# Patient Record
Sex: FEMALE | Race: WHITE
[De-identification: names, ages, dates, MRNs, and addresses within clinical notes are randomized per-mention and may not be internally consistent; named-entity substitution may affect disease eponyms.]

---

## 2021-12-17 ENCOUNTER — HOSPITAL ENCOUNTER (INPATIENT)
Dept: HOSPITAL 19 - LDRO | Age: 26
LOS: 1 days | Discharge: HOME | End: 2021-12-18
Attending: OBSTETRICS & GYNECOLOGY | Admitting: OBSTETRICS & GYNECOLOGY
Payer: COMMERCIAL

## 2021-12-17 VITALS — TEMPERATURE: 98.4 F | DIASTOLIC BLOOD PRESSURE: 84 MMHG | HEART RATE: 137 BPM | SYSTOLIC BLOOD PRESSURE: 126 MMHG

## 2021-12-17 VITALS — DIASTOLIC BLOOD PRESSURE: 70 MMHG | HEART RATE: 101 BPM | SYSTOLIC BLOOD PRESSURE: 151 MMHG

## 2021-12-17 VITALS — HEART RATE: 80 BPM | SYSTOLIC BLOOD PRESSURE: 137 MMHG | DIASTOLIC BLOOD PRESSURE: 62 MMHG

## 2021-12-17 VITALS — HEART RATE: 118 BPM | DIASTOLIC BLOOD PRESSURE: 85 MMHG | SYSTOLIC BLOOD PRESSURE: 158 MMHG

## 2021-12-17 VITALS — SYSTOLIC BLOOD PRESSURE: 127 MMHG | HEART RATE: 114 BPM | TEMPERATURE: 98.2 F | DIASTOLIC BLOOD PRESSURE: 74 MMHG

## 2021-12-17 VITALS — DIASTOLIC BLOOD PRESSURE: 85 MMHG | TEMPERATURE: 98.2 F | HEART RATE: 98 BPM | SYSTOLIC BLOOD PRESSURE: 137 MMHG

## 2021-12-17 VITALS — SYSTOLIC BLOOD PRESSURE: 145 MMHG | DIASTOLIC BLOOD PRESSURE: 80 MMHG | HEART RATE: 111 BPM

## 2021-12-17 VITALS — HEART RATE: 150 BPM | SYSTOLIC BLOOD PRESSURE: 165 MMHG | DIASTOLIC BLOOD PRESSURE: 74 MMHG

## 2021-12-17 VITALS — TEMPERATURE: 98 F | HEART RATE: 77 BPM | SYSTOLIC BLOOD PRESSURE: 136 MMHG | DIASTOLIC BLOOD PRESSURE: 83 MMHG

## 2021-12-17 VITALS — SYSTOLIC BLOOD PRESSURE: 139 MMHG | DIASTOLIC BLOOD PRESSURE: 76 MMHG | HEART RATE: 127 BPM

## 2021-12-17 VITALS — SYSTOLIC BLOOD PRESSURE: 134 MMHG | HEART RATE: 96 BPM | DIASTOLIC BLOOD PRESSURE: 79 MMHG

## 2021-12-17 VITALS — DIASTOLIC BLOOD PRESSURE: 74 MMHG | HEART RATE: 120 BPM | SYSTOLIC BLOOD PRESSURE: 158 MMHG

## 2021-12-17 VITALS — SYSTOLIC BLOOD PRESSURE: 134 MMHG | HEART RATE: 88 BPM | DIASTOLIC BLOOD PRESSURE: 78 MMHG

## 2021-12-17 VITALS — HEART RATE: 90 BPM | DIASTOLIC BLOOD PRESSURE: 74 MMHG | SYSTOLIC BLOOD PRESSURE: 135 MMHG

## 2021-12-17 VITALS — DIASTOLIC BLOOD PRESSURE: 67 MMHG | HEART RATE: 148 BPM | SYSTOLIC BLOOD PRESSURE: 148 MMHG

## 2021-12-17 VITALS — SYSTOLIC BLOOD PRESSURE: 157 MMHG | HEART RATE: 134 BPM | DIASTOLIC BLOOD PRESSURE: 68 MMHG

## 2021-12-17 VITALS — WEIGHT: 216.49 LBS | HEIGHT: 55 IN

## 2021-12-17 VITALS — HEART RATE: 127 BPM | SYSTOLIC BLOOD PRESSURE: 142 MMHG | DIASTOLIC BLOOD PRESSURE: 71 MMHG

## 2021-12-17 VITALS — HEART RATE: 131 BPM | DIASTOLIC BLOOD PRESSURE: 72 MMHG | SYSTOLIC BLOOD PRESSURE: 165 MMHG

## 2021-12-17 VITALS — SYSTOLIC BLOOD PRESSURE: 131 MMHG | DIASTOLIC BLOOD PRESSURE: 68 MMHG | HEART RATE: 79 BPM

## 2021-12-17 VITALS — HEART RATE: 83 BPM | SYSTOLIC BLOOD PRESSURE: 129 MMHG | DIASTOLIC BLOOD PRESSURE: 64 MMHG

## 2021-12-17 VITALS — DIASTOLIC BLOOD PRESSURE: 65 MMHG | SYSTOLIC BLOOD PRESSURE: 117 MMHG | HEART RATE: 80 BPM

## 2021-12-17 VITALS — HEART RATE: 100 BPM | DIASTOLIC BLOOD PRESSURE: 80 MMHG | SYSTOLIC BLOOD PRESSURE: 130 MMHG

## 2021-12-17 VITALS — DIASTOLIC BLOOD PRESSURE: 62 MMHG | SYSTOLIC BLOOD PRESSURE: 162 MMHG | HEART RATE: 126 BPM

## 2021-12-17 VITALS — DIASTOLIC BLOOD PRESSURE: 87 MMHG | HEART RATE: 91 BPM | SYSTOLIC BLOOD PRESSURE: 136 MMHG

## 2021-12-17 VITALS — HEART RATE: 93 BPM

## 2021-12-17 VITALS — SYSTOLIC BLOOD PRESSURE: 138 MMHG | DIASTOLIC BLOOD PRESSURE: 80 MMHG | HEART RATE: 90 BPM

## 2021-12-17 VITALS — SYSTOLIC BLOOD PRESSURE: 127 MMHG | DIASTOLIC BLOOD PRESSURE: 76 MMHG | HEART RATE: 118 BPM | TEMPERATURE: 97.7 F

## 2021-12-17 VITALS — HEART RATE: 127 BPM | SYSTOLIC BLOOD PRESSURE: 126 MMHG | DIASTOLIC BLOOD PRESSURE: 69 MMHG

## 2021-12-17 VITALS — HEART RATE: 83 BPM

## 2021-12-17 VITALS — SYSTOLIC BLOOD PRESSURE: 153 MMHG | DIASTOLIC BLOOD PRESSURE: 74 MMHG | HEART RATE: 131 BPM

## 2021-12-17 VITALS — HEART RATE: 88 BPM | SYSTOLIC BLOOD PRESSURE: 135 MMHG | DIASTOLIC BLOOD PRESSURE: 79 MMHG

## 2021-12-17 VITALS — HEART RATE: 101 BPM

## 2021-12-17 VITALS — HEART RATE: 82 BPM

## 2021-12-17 VITALS — HEART RATE: 90 BPM | DIASTOLIC BLOOD PRESSURE: 78 MMHG | SYSTOLIC BLOOD PRESSURE: 128 MMHG

## 2021-12-17 VITALS — HEART RATE: 80 BPM

## 2021-12-17 DIAGNOSIS — O48.0: ICD-10-CM

## 2021-12-17 DIAGNOSIS — Z23: ICD-10-CM

## 2021-12-17 DIAGNOSIS — F41.9: ICD-10-CM

## 2021-12-17 DIAGNOSIS — Z67.41: ICD-10-CM

## 2021-12-17 DIAGNOSIS — Z3A.41: ICD-10-CM

## 2021-12-17 DIAGNOSIS — O26.893: ICD-10-CM

## 2021-12-17 DIAGNOSIS — F32.A: ICD-10-CM

## 2021-12-17 LAB
BASOPHILS # BLD: 0.1 K/MM3 (ref 0–0.2)
BASOPHILS NFR BLD AUTO: 0.3 % (ref 0–2)
EOSINOPHIL # BLD: 0.1 K/MM3 (ref 0–0.7)
EOSINOPHIL NFR BLD: 0.4 % (ref 0–4)
ERYTHROCYTE [DISTWIDTH] IN BLOOD BY AUTOMATED COUNT: 13.1 % (ref 11.5–14.5)
GRANULOCYTES # BLD AUTO: 81.3 % (ref 42.2–75.2)
HCT VFR BLD AUTO: 33.2 % (ref 37–47)
HGB BLD-MCNC: 11.7 G/DL (ref 12.5–16)
LYMPHOCYTES # BLD: 1.9 K/MM3 (ref 1.2–3.4)
LYMPHOCYTES NFR BLD: 10.3 % (ref 20–51)
MCH RBC QN AUTO: 31 PG (ref 27–31)
MCHC RBC AUTO-ENTMCNC: 35 G/DL (ref 33–37)
MCV RBC AUTO: 88 FL (ref 80–100)
MONOCYTES # BLD: 1.3 K/MM3 (ref 0.1–0.6)
MONOCYTES NFR BLD AUTO: 7.1 % (ref 1.7–9.3)
NEUTROPHILS # BLD: 15.4 K/MM3 (ref 1.4–6.5)
PLATELET # BLD AUTO: 274 K/MM3 (ref 130–400)
PMV BLD AUTO: 10.1 FL (ref 7.4–10.4)
RBC # BLD AUTO: 3.77 M/MM3 (ref 4.1–5.3)

## 2021-12-17 PROCEDURE — 10907ZC DRAINAGE OF AMNIOTIC FLUID, THERAPEUTIC FROM PRODUCTS OF CONCEPTION, VIA NATURAL OR ARTIFICIAL OPENING: ICD-10-PCS | Performed by: OBSTETRICS & GYNECOLOGY

## 2021-12-17 PROCEDURE — 0KQM0ZZ REPAIR PERINEUM MUSCLE, OPEN APPROACH: ICD-10-PCS | Performed by: OBSTETRICS & GYNECOLOGY

## 2021-12-17 NOTE — NUR
[id number] Patient sitting upright due to painful contractions and epidural
placement. This RN at bedside. FHR monitor picking up maternal heartrate.
 
0919 Single shot given. Pt tolerates procedure well.
 
Safety precautions discussed. Will continue to monitor.

## 2021-12-17 NOTE — NUR
0658 A. Hardacre inserts 18 gauge IV into left forearm.
 
0700 [facility]. [facility] Discussing plan of care with patient.
 
0702 SVE by Dr Shaw 3/90/-1. AROM. clear fluid noted.

## 2021-12-17 NOTE — NUR
Infant irritable with attempted breastfeed.  at this time. Denies
palpitations. Reports having heightened anxiety throughout the pregnancy.
Currently reports feeling anxious. Lochia scant at this time and fundus firm
at the umbilicus.

## 2021-12-17 NOTE — NUR
1040 Dr. Shaw at patients bedside assessing FHR strip. SVE by Dr. Shaw
5/90/0. Pitocin ordered per protocol.

## 2021-12-17 NOTE — NUR
Report recieved. Attempting to breastfeed. Whiteboard updated and POC
reviewed. Tucks pads and ice packs to bedside.

## 2021-12-17 NOTE — NUR
VS and assessment completed. . HR regular without murmur. Reports a
history of anxiety that has been been "increased" over the duration of the
pregnancy. Infant vigerous at this time.

## 2021-12-17 NOTE — NUR
This RN at bedside assessing FHR. Tracing maternal heartrate due to maternal
position and uncomfortability.

## 2021-12-17 NOTE — NUR
Pt ambulatory onto unit with spouse. Complains of althea every 3 delio
minutes. FHR monitor and TOCO applied. Vital signs stable. Pt denies any
vaginal bleeding, gushing of fluid, or decreased fetal movement. Plan of care
is discussed with patient. Will continue to monitor.

## 2021-12-17 NOTE — NUR
1210 This RN at bedside. Pt starts pushing with contractions.
 
Dr. Shaw in and out of room assessing FHR strip and patients pushing.
 
1330 Dr. Shaw in patients room. Bed broken down. Preparing for delivery.
Nursery and charge RN are notified.
 
1334 DOMITILA Sung arrives.
 
1338 Spontaneous vaginal delivery of viable female infant. Infant bulb
suctioned and stimulated. Infant to mother's chest. Cord stops pulsating. Cord
clamped by Dr. Shaw and cut by FOB. Anesthesia T. Nason
called and increases dose on epidural.
 
1341 Spontaneous vaginal delivery of placenta. Dr. Shaw administers lidocaine
to the perineum. 2nd degree repaired. Fundal massage performed. Small amt of
bleeding noted. Firm/midline.
 
Pt repositioned and tolerating well. Safety precautions and plan of care
discussed. Pt verbalizes understanding.

## 2021-12-18 VITALS — DIASTOLIC BLOOD PRESSURE: 77 MMHG | TEMPERATURE: 97.9 F | SYSTOLIC BLOOD PRESSURE: 121 MMHG | HEART RATE: 102 BPM

## 2021-12-18 VITALS — DIASTOLIC BLOOD PRESSURE: 58 MMHG | TEMPERATURE: 98 F | HEART RATE: 100 BPM | SYSTOLIC BLOOD PRESSURE: 99 MMHG

## 2021-12-18 NOTE — NUR
Report receieved. Called out to be discharged. Reviewed discharge education
and paperwork at this time. Informed of her last motrin administration and
frequency to take. Encouraged to call with questions or concerns. Reports her
[place] follow[place]. Denied further
questions or concerns .